# Patient Record
Sex: MALE | Race: BLACK OR AFRICAN AMERICAN | NOT HISPANIC OR LATINO | URBAN - METROPOLITAN AREA
[De-identification: names, ages, dates, MRNs, and addresses within clinical notes are randomized per-mention and may not be internally consistent; named-entity substitution may affect disease eponyms.]

---

## 2022-09-01 ENCOUNTER — EMERGENCY (EMERGENCY)
Facility: HOSPITAL | Age: 47
LOS: 1 days | Discharge: HOME | End: 2022-09-01
Attending: EMERGENCY MEDICINE | Admitting: EMERGENCY MEDICINE
Payer: COMMERCIAL

## 2022-09-01 VITALS
DIASTOLIC BLOOD PRESSURE: 106 MMHG | SYSTOLIC BLOOD PRESSURE: 154 MMHG | RESPIRATION RATE: 18 BRPM | OXYGEN SATURATION: 99 % | HEART RATE: 99 BPM

## 2022-09-01 VITALS
TEMPERATURE: 98 F | RESPIRATION RATE: 19 BRPM | OXYGEN SATURATION: 99 % | DIASTOLIC BLOOD PRESSURE: 106 MMHG | HEART RATE: 97 BPM | SYSTOLIC BLOOD PRESSURE: 140 MMHG

## 2022-09-01 DIAGNOSIS — N50.811 RIGHT TESTICULAR PAIN: ICD-10-CM

## 2022-09-01 DIAGNOSIS — I86.1 SCROTAL VARICES: ICD-10-CM

## 2022-09-01 DIAGNOSIS — N45.1 EPIDIDYMITIS: ICD-10-CM

## 2022-09-01 DIAGNOSIS — R73.03 PREDIABETES: ICD-10-CM

## 2022-09-01 LAB
APPEARANCE UR: CLEAR — SIGNIFICANT CHANGE UP
BACTERIA # UR AUTO: PRESENT /HPF
BILIRUB UR-MCNC: NEGATIVE — SIGNIFICANT CHANGE UP
COLOR SPEC: YELLOW — SIGNIFICANT CHANGE UP
DIFF PNL FLD: ABNORMAL
EPI CELLS # UR: SIGNIFICANT CHANGE UP /HPF (ref 0–5)
GLUCOSE UR QL: NEGATIVE — SIGNIFICANT CHANGE UP
KETONES UR-MCNC: NEGATIVE — SIGNIFICANT CHANGE UP
LEUKOCYTE ESTERASE UR-ACNC: NEGATIVE — SIGNIFICANT CHANGE UP
NITRITE UR-MCNC: NEGATIVE — SIGNIFICANT CHANGE UP
PH UR: 6 — SIGNIFICANT CHANGE UP (ref 5–8)
PROT UR-MCNC: NEGATIVE MG/DL — SIGNIFICANT CHANGE UP
RBC CASTS # UR COMP ASSIST: < 5 /HPF — SIGNIFICANT CHANGE UP
SP GR SPEC: >=1.03 — SIGNIFICANT CHANGE UP (ref 1–1.03)
UROBILINOGEN FLD QL: 0.2 E.U./DL — SIGNIFICANT CHANGE UP
WBC UR QL: < 5 /HPF — SIGNIFICANT CHANGE UP

## 2022-09-01 PROCEDURE — 87591 N.GONORRHOEAE DNA AMP PROB: CPT

## 2022-09-01 PROCEDURE — 76870 US EXAM SCROTUM: CPT

## 2022-09-01 PROCEDURE — 99284 EMERGENCY DEPT VISIT MOD MDM: CPT | Mod: 25

## 2022-09-01 PROCEDURE — 99284 EMERGENCY DEPT VISIT MOD MDM: CPT

## 2022-09-01 PROCEDURE — 81001 URINALYSIS AUTO W/SCOPE: CPT

## 2022-09-01 PROCEDURE — 87491 CHLMYD TRACH DNA AMP PROBE: CPT

## 2022-09-01 PROCEDURE — 76870 US EXAM SCROTUM: CPT | Mod: 26

## 2022-09-01 RX ORDER — IBUPROFEN 200 MG
600 TABLET ORAL ONCE
Refills: 0 | Status: COMPLETED | OUTPATIENT
Start: 2022-09-01 | End: 2022-09-01

## 2022-09-01 RX ORDER — CEFTRIAXONE 500 MG/1
500 INJECTION, POWDER, FOR SOLUTION INTRAMUSCULAR; INTRAVENOUS ONCE
Refills: 0 | Status: DISCONTINUED | OUTPATIENT
Start: 2022-09-01 | End: 2022-09-04

## 2022-09-01 NOTE — ED PROVIDER NOTE - OBJECTIVE STATEMENT
47M hx predm, c/o right testicular pain. pt states pain worsening since yesterday. no n/v/d. no fevers. no dysuria, no discharge.  pt states had epididymitis in the past with similar pain - states was non-STI related.  pt states he did receive oral sex a week ago.

## 2022-09-01 NOTE — ED ADULT TRIAGE NOTE - ARRIVAL INFO ADDITIONAL COMMENTS
Pt to ED from home c/o Right Testicular pain X2 days. Per Pt I think it is epididymitis. I had it last year". Endorses Swelling to site. Noted to be hypertensive at this time 140/106. Denies hx of Hypertension, n/v/d, dizziness, fever, nor chest pain.

## 2022-09-01 NOTE — ED PROVIDER NOTE - PROGRESS NOTE DETAILS
will treat for epididymitis, f/u with   I have discussed the discharge plan with the patient. The patient agrees with the plan, as discussed.  The patient understands Emergency Department diagnosis is a preliminary diagnosis often based on limited information and that the patient must adhere to the follow-up plan as discussed.  The patient understands that if the symptoms worsen or if prescribed medications do not have the desired/planned effect that the patient may return to the Emergency Department at any time for further evaluation and treatment.

## 2022-09-01 NOTE — ED PROVIDER NOTE - NSFOLLOWUPCLINICS_GEN_ALL_ED_FT
[facility]  Urology  [address], [address]  [place], [place] [address]  Phone: [phone]  Fax:

## 2022-09-01 NOTE — ED PROVIDER NOTE - PHYSICAL EXAMINATION
SHAWN currie    R testicle - TTP to epididymis, no testicular pain/swelling, no redness, no hernia, +cremasteric reflex

## 2022-09-01 NOTE — ED ADULT NURSE NOTE - OBJECTIVE STATEMENT
Pt is 47M presents to ED c/o R testicular pain x2 days, pt states PMH epididymitis, per pt pain feels similar. Pt is sexually active, past epididymitis not d/t STI/STD. Pt A&Ox4, ambulatory with steady gait, speaking in clear/full sentences, able to void.

## 2022-09-01 NOTE — ED PROVIDER NOTE - CARE PROVIDER_API CALL
Segundo Adkins)  Urology  [address] [place] [address], [place] [address]  Phone: [phone]  Fax: [phone]  Follow Up Time:

## 2022-09-01 NOTE — ED PROVIDER NOTE - PATIENT PORTAL LINK FT
You can access the FollowMyHealth Patient Portal offered by [facility] by registering at the following website: [email or web address]. By joining [employer]’s FollowMyHealth portal, you will also be able to view your health information using other applications (apps) compatible with our system.

## 2022-09-01 NOTE — ED PROVIDER NOTE - NSFOLLOWUPINSTRUCTIONS_ED_ALL_ED_FT
Follow-up with urology      Epididymitis    Epididymitis is swelling (inflammation) or infection of the epididymis. The epididymis is a cord-like structure that is located along the top and back part of the testicle. It collects and stores sperm from the testicle.    This condition can also cause pain and swelling of the testicle and scrotum. Symptoms usually start suddenly (acute epididymitis). Sometimes epididymitis starts gradually and lasts for a while (chronic epididymitis). This type may be harder to treat.    What are the causes?    In men ages 20–40, this condition is usually caused by a bacterial infection or a sexually transmitted disease (STD), such as:  •Gonorrhea.    •Chlamydia.    In men 40 and older who do not have anal sex, this condition is usually caused by bacteria from a blockage or from abnormalities in the urinary system. These can result from:  •Having a tube placed into the bladder (urinary catheter).    •Having an enlarged or inflamed prostate gland.    •Having recently had urinary tract surgery.    •Having a problem with a backward flow of urine (retrograde).    In men who have a condition that weakens the body's defense system (immune system), such as HIV, this condition can be caused by:  •Other bacteria, including tuberculosis and syphilis.    •Viruses.    •Fungi.    Sometimes this condition occurs without infection. This may happen because of trauma or repetitive activities such as sports.    What increases the risk?    You are more likely to develop this condition if you have:  •Unprotected sex with more than one partner.    •Anal sex.    •Recently had surgery.    •A urinary catheter.    •Urinary problems.    •A suppressed immune system.    What are the signs or symptoms?    This condition usually begins suddenly with chills, fever, and pain behind the scrotum and in the testicle. Other symptoms include:  •Swelling of the scrotum, testicle, or both.    •Pain when ejaculating or urinating.    •Pain in the back or abdomen.    •Nausea.    •Itching and discharge from the penis.    •A frequent need to pass urine.    •Redness, increased warmth, and tenderness of the scrotum.    How is this diagnosed?    Your health care provider can diagnose this condition based on your symptoms and medical history. Your health care provider will also do a physical exam to ask about your symptoms and check your scrotum and testicle for swelling, pain, and redness. You may also have other tests, including:  •Examination of discharge from the penis.    •Urine tests for infections, such as STDs.    •Ultrasound test for blood flow and inflammation.    Your health care provider may test you for other STDs, including HIV.    How is this treated?    Treatment for this condition depends on the cause. If your condition is caused by a bacterial infection, oral antibiotic medicine may be prescribed. If the bacterial infection has spread to your blood, you may need to receive IV antibiotics.    For both bacterial and nonbacterial epididymitis, you may be treated with:  •Rest.    •Elevation of the scrotum.    •Pain medicines.    •Anti-inflammatory medicines.    Surgery may be needed to treat:  •Bacterial epididymitis that causes pus to build up in the scrotum (abscess).    •Chronic epididymitis that has not responded to other treatments.    Follow these instructions at home:    Medicines     •Take over-the-counter and prescription medicines only as told by your health care provider.    •If you were prescribed an antibiotic medicine, take it as told by your health care provider. Do not stop taking the antibiotic even if your condition improves.    Sexual activity     •If your epididymitis was caused by an STD, avoid sexual activity until your treatment is complete.    •Inform your sexual partner or partners if you test positive for an STD. They may need to be treated. Do not engage in sexual activity with your partner or partners until their treatment is completed.    Managing pain and swelling    •If directed, elevate your scrotum and apply ice.  •Put ice in a plastic bag.    •Place a small towel or pillow between your legs.    •Rest your scrotum on the pillow or towel.    •Place another towel between your skin and the plastic bag.    •Leave the ice on for 20 minutes, 2–3 times a day.    •Try taking a sitz bath to help with discomfort. This is a warm water bath that is taken while you are sitting down. The water should only come up to your hips and should cover your buttocks. Do this 3–4 times per day or as told by your health care provider.    •Keep your scrotum elevated and supported while resting. Ask your health care provider if you should wear a scrotal support, such as a jockstrap. Wear it as told by your health care provider.    General instructions     •Return to your normal activities as told by your health care provider. Ask your health care provider what activities are safe for you.    •Drink enough fluid to keep your urine pale yellow.    •Keep all follow-up visits as told by your health care provider. This is important.    Contact a health care provider if:    •You have a fever.    •Your pain medicine is not helping.    •Your pain is getting worse.    •Your symptoms do not improve within 3 days.    Summary    •Epididymitis is swelling (inflammation) or infection of the epididymis. This condition can also cause pain and swelling of the testicle and scrotum.    •Treatment for this condition depends on the cause. If your condition is caused by a bacterial infection, oral antibiotic medicine may be prescribed.    •Inform your sexual partner or partners if you test positive for an STD. They may need to be treated. Do not engage in sexual activity with your partner or partners until their treatment is completed.    •Contact a health care provider if your symptoms do not improve within 3 days.    This information is not intended to replace advice given to you by your health care provider. Make sure you discuss any questions you have with your health care provider.

## 2022-09-02 LAB
C TRACH RRNA SPEC QL NAA+PROBE: SIGNIFICANT CHANGE UP
N GONORRHOEA RRNA SPEC QL NAA+PROBE: SIGNIFICANT CHANGE UP
SPECIMEN SOURCE: SIGNIFICANT CHANGE UP